# Patient Record
Sex: MALE | Race: ASIAN | URBAN - METROPOLITAN AREA
[De-identification: names, ages, dates, MRNs, and addresses within clinical notes are randomized per-mention and may not be internally consistent; named-entity substitution may affect disease eponyms.]

---

## 2017-03-24 ENCOUNTER — OFFICE VISIT (OUTPATIENT)
Dept: FAMILY MEDICINE | Facility: CLINIC | Age: 47
End: 2017-03-24
Payer: COMMERCIAL

## 2017-03-24 VITALS
HEART RATE: 88 BPM | BODY MASS INDEX: 23.92 KG/M2 | TEMPERATURE: 98.5 F | WEIGHT: 130 LBS | HEIGHT: 62 IN | SYSTOLIC BLOOD PRESSURE: 110 MMHG | DIASTOLIC BLOOD PRESSURE: 76 MMHG

## 2017-03-24 DIAGNOSIS — G47.09 OTHER INSOMNIA: ICD-10-CM

## 2017-03-24 DIAGNOSIS — M54.50 ACUTE BILATERAL LOW BACK PAIN WITHOUT SCIATICA: Primary | ICD-10-CM

## 2017-03-24 DIAGNOSIS — Z72.0 TOBACCO ABUSE: ICD-10-CM

## 2017-03-24 LAB
ALBUMIN SERPL-MCNC: 4.2 G/DL (ref 3.4–5)
ALP SERPL-CCNC: 63 U/L (ref 40–150)
ALT SERPL W P-5'-P-CCNC: 32 U/L (ref 0–70)
ANION GAP SERPL CALCULATED.3IONS-SCNC: 6 MMOL/L (ref 3–14)
AST SERPL W P-5'-P-CCNC: 16 U/L (ref 0–45)
BASOPHILS # BLD AUTO: 0 10E9/L (ref 0–0.2)
BASOPHILS NFR BLD AUTO: 0.2 %
BILIRUB SERPL-MCNC: 0.3 MG/DL (ref 0.2–1.3)
BUN SERPL-MCNC: 14 MG/DL (ref 7–30)
CALCIUM SERPL-MCNC: 9.3 MG/DL (ref 8.5–10.1)
CHLORIDE SERPL-SCNC: 100 MMOL/L (ref 94–109)
CHOLEST SERPL-MCNC: 252 MG/DL
CO2 SERPL-SCNC: 29 MMOL/L (ref 20–32)
CREAT SERPL-MCNC: 0.8 MG/DL (ref 0.66–1.25)
DIFFERENTIAL METHOD BLD: NORMAL
EOSINOPHIL # BLD AUTO: 0.1 10E9/L (ref 0–0.7)
EOSINOPHIL NFR BLD AUTO: 1.9 %
ERYTHROCYTE [DISTWIDTH] IN BLOOD BY AUTOMATED COUNT: 12.2 % (ref 10–15)
GFR SERPL CREATININE-BSD FRML MDRD: NORMAL ML/MIN/1.7M2
GLUCOSE SERPL-MCNC: 97 MG/DL (ref 70–99)
HCT VFR BLD AUTO: 44.8 % (ref 40–53)
HDLC SERPL-MCNC: 51 MG/DL
HGB BLD-MCNC: 15 G/DL (ref 13.3–17.7)
LDLC SERPL CALC-MCNC: ABNORMAL MG/DL
LDLC SERPL DIRECT ASSAY-MCNC: 145 MG/DL
LYMPHOCYTES # BLD AUTO: 2.4 10E9/L (ref 0.8–5.3)
LYMPHOCYTES NFR BLD AUTO: 38.1 %
MCH RBC QN AUTO: 31.6 PG (ref 26.5–33)
MCHC RBC AUTO-ENTMCNC: 33.5 G/DL (ref 31.5–36.5)
MCV RBC AUTO: 95 FL (ref 78–100)
MONOCYTES # BLD AUTO: 0.4 10E9/L (ref 0–1.3)
MONOCYTES NFR BLD AUTO: 6.8 %
NEUTROPHILS # BLD AUTO: 3.3 10E9/L (ref 1.6–8.3)
NEUTROPHILS NFR BLD AUTO: 53 %
NONHDLC SERPL-MCNC: 201 MG/DL
PLATELET # BLD AUTO: 257 10E9/L (ref 150–450)
POTASSIUM SERPL-SCNC: 4.1 MMOL/L (ref 3.4–5.3)
PROT SERPL-MCNC: 7.8 G/DL (ref 6.8–8.8)
RBC # BLD AUTO: 4.74 10E12/L (ref 4.4–5.9)
SODIUM SERPL-SCNC: 135 MMOL/L (ref 133–144)
TRIGL SERPL-MCNC: 458 MG/DL
TSH SERPL DL<=0.005 MIU/L-ACNC: 2.76 MU/L (ref 0.4–4)
WBC # BLD AUTO: 6.2 10E9/L (ref 4–11)

## 2017-03-24 PROCEDURE — 80053 COMPREHEN METABOLIC PANEL: CPT | Performed by: NURSE PRACTITIONER

## 2017-03-24 PROCEDURE — 99204 OFFICE O/P NEW MOD 45 MIN: CPT | Performed by: NURSE PRACTITIONER

## 2017-03-24 PROCEDURE — 84443 ASSAY THYROID STIM HORMONE: CPT | Performed by: NURSE PRACTITIONER

## 2017-03-24 PROCEDURE — 36415 COLL VENOUS BLD VENIPUNCTURE: CPT | Performed by: NURSE PRACTITIONER

## 2017-03-24 PROCEDURE — 80061 LIPID PANEL: CPT | Performed by: NURSE PRACTITIONER

## 2017-03-24 PROCEDURE — 85025 COMPLETE CBC W/AUTO DIFF WBC: CPT | Performed by: NURSE PRACTITIONER

## 2017-03-24 PROCEDURE — 83721 ASSAY OF BLOOD LIPOPROTEIN: CPT | Mod: 59 | Performed by: NURSE PRACTITIONER

## 2017-03-24 RX ORDER — CYCLOBENZAPRINE HCL 10 MG
10 TABLET ORAL EVERY 8 HOURS PRN
Qty: 30 TABLET | Refills: 0 | Status: SHIPPED | OUTPATIENT
Start: 2017-03-24 | End: 2018-04-09

## 2017-03-24 RX ORDER — NAPROXEN 500 MG/1
TABLET ORAL
Qty: 30 TABLET | Refills: 0 | Status: SHIPPED | OUTPATIENT
Start: 2017-03-24 | End: 2018-04-09

## 2017-03-24 ASSESSMENT — ENCOUNTER SYMPTOMS
DIARRHEA: 0
EYE DISCHARGE: 0
RHINORRHEA: 0
HEADACHES: 0
SLEEP DISTURBANCE: 1
FATIGUE: 0
NAUSEA: 0
SHORTNESS OF BREATH: 0
COUGH: 0
SINUS PRESSURE: 0
VOMITING: 0
SORE THROAT: 0
DIAPHORESIS: 0
BACK PAIN: 1
FEVER: 0
WHEEZING: 0

## 2017-03-24 NOTE — PROGRESS NOTES
SUBJECTIVE:                                                    Juan Carlos Delgado is a 46 year old male who presents to clinic today for the following health issues:      Back Pain      Duration: 1 week        Specific cause: none    Description:   Location of pain: low back center  Character of pain: sharp  Pain radiation:none  New numbness or weakness in legs, not attributed to pain:  no     Intensity: At its worst 7-8/10    History:   Pain interferes with job: YES  History of back problems: no prior back problems  Any previous MRI or X-rays: None  Sees a specialist for back pain:  No    Therapies tried without relief: none    Alleviating factors: Improved by: heat, unknown OTC pain relief    Precipitating factors:  Worsened by: Bending    Functional and Psychosocial Screen ([employer] STarT Back):      Not performed today   Accompanying Signs & Symptoms:  Risk of Fracture:  None  Risk of Cauda Equina:  None  Risk of Infection:  None  Risk of Cancer:  None  Risk of Ankylosing Spondylitis:  Onset at age <35, male, AND morning back stiffness. no            Here today with .     Has been having some back pain. Had back pain about 10 years ago as well. Occurred after lifted something heavy. This time bent over and felt a popping in back. Was cleaning car. Pain to lower back. No pain down legs. No loss of control of bowel or bladder. Injury occurred about 1 week ago. Pain has been gradually getting better. Has been using ice and heat at home and taking some medication.     Just moved here about 2 months ago from [place]. Recently had tetanus vaccine prior to coming to [place]. Had it in [place]. 2 years ago had cholesterol checked and it was ok. Is fasting today and would would like to have it checked again.     Has a hard time sleeping at night. Thinks gets about 4 hours of sleep per night. Has a hard time falling asleep but one is asleep does ok. No home meds to help with sleep. Some nights is able to sleep ok.  "Thinks happens about 6 nights a week.     Problem list and histories reviewed & adjusted, as indicated.  Additional history: as documented    Current Outpatient Prescriptions   Medication Sig Dispense Refill     naproxen (NAPROSYN) 500 MG tablet Take twice per day with food for 2 weeks, then as needed 30 tablet 0     cyclobenzaprine (FLEXERIL) 10 MG tablet Take 1 tablet (10 mg) by mouth every 8 hours as needed for muscle spasms 30 tablet 0     No Known Allergies    Reviewed and updated as needed this visit by clinical staff  Tobacco  Allergies  Meds  Med Hx  Surg Hx  Fam Hx  Soc Hx      Reviewed and updated as needed this visit by Provider         ROS:  Review of Systems   Constitutional: Negative for diaphoresis, fatigue and fever.   HENT: Negative for congestion, ear pain, rhinorrhea, sinus pressure and sore throat.    Eyes: Negative for discharge.   Respiratory: Negative for cough, shortness of breath and wheezing.    Cardiovascular: Negative for chest pain.   Gastrointestinal: Negative for diarrhea, nausea and vomiting.   Musculoskeletal: Positive for back pain (lower back).   Neurological: Negative for headaches.   Psychiatric/Behavioral: Positive for sleep disturbance (trouble falling asleep).         OBJECTIVE:                                                    /76 (BP Location: Left arm, Patient Position: Chair, Cuff Size: Adult Regular)  Pulse 88  Temp 98.5  F (36.9  C) (Tympanic)  Ht 5' 2\" (1.575 m)  Wt 130 lb (59 kg)  BMI 23.78 kg/m2  Body mass index is 23.78 kg/(m^2).  Physical Exam   Constitutional: He appears well-developed and well-nourished.   HENT:   Head: Normocephalic and atraumatic.   Right Ear: Tympanic membrane and external ear normal.   Left Ear: Tympanic membrane and external ear normal.   Nose: No mucosal edema or rhinorrhea.   Cardiovascular: Normal rate, regular rhythm and normal heart sounds.    Pulmonary/Chest: Effort normal and breath sounds normal.   Abdominal: Soft. " Bowel sounds are normal.   Musculoskeletal:        Back:         Arms:  Neurological: He is alert.   Skin: Skin is warm and dry.   Psychiatric: He has a normal mood and affect.        ASSESSMENT/PLAN:                                                    1. Acute bilateral low back pain without sciatica  Educated regarding proper lifting technique  May apply ice and heat as needed  Educated on use of meds  - naproxen (NAPROSYN) 500 MG tablet; Take twice per day with food for 2 weeks, then as needed  Dispense: 30 tablet; Refill: 0  - cyclobenzaprine (FLEXERIL) 10 MG tablet; Take 1 tablet (10 mg) by mouth every 8 hours as needed for muscle spasms  Dispense: 30 tablet; Refill: 0    2. Other insomnia  Check basic labs today  Encouraged to start taking over the counter melatonin   Follow up if needed  - CBC with platelets differential  - Comprehensive metabolic panel  - Lipid panel reflex to direct LDL  - TSH with free T4 reflex    3. Tobacco abuse  Encouraged to stop smoking     Plans to bring copies of medical records from [facility] to the clinic so can enter into chart.           GARRETT Thompson [facility]

## 2017-03-24 NOTE — PATIENT INSTRUCTIONS
Try over the counter melatonin to help with sleep.     These are general instructions and may not be specific to you. Please call, email or follow up if you have any questions or concerns.     ?au c?/l?ng [Marleni najera]    ?au c? và ?au l?ng ??u th??ng garcia?t phát t? vi?c b? th??ng ? các c? ho?c dây ch?ng vùng c?t s?ng. ?ôi khi ??a ??m có ch?c n?ng tách các ??t x??ng s?t có th? gây ?âu do t?o l?c ép lên dây th?n kinh g?n nó. ?au c? và ?au l?ng có th? garcia?t hi?n jl m?t l?c v?n mình/co mình ??t ng?t (ch?ng h?n nh? karl m?t rosa isela n?n xe h?i) ho?c ?ôi khi ch? do m?t oumar?n ??ng v?ng v? ??n thu?n. Dù karl tr??ng h?p nào, c? th??ng b? co c?ng và khi?n c?n ?au t?ng thêm.  ?au c? và ?au l?ng c?p tính th??ng ?? jl m?t ??n jerome tu?n. ?au liên estella t?i b?nh ? ??a ??m, viêm kh?p (arthritis) t?i các kh?p c?t s?ng ho?c chít h?p (stenosis) c?t s?ng (h?p c?t s?ng) có th? tr? thành mãn tính và kéo dài hàng tháng, th?m chí hàng n?m.  Ch?m sóc t?i paddy:    ??I V?I ?AU C?: S? d?ng m?t chi?c g?i m?m ?? ?? ??u và gi? c?t s?ng ? v? trí ngh?. V? trí ??u không nên ?? nghiêng v? phía tr??c hay phía jl.    ??I V?I ?AU L?NG: Quý v? có th? c?n ph?i ? trên gi??ng karl vài ngày ??u tiên. Nh?ng hãy b?t ??u ng?i d?y ho?c ?i l?i càng s?m càng t?t ?? tránh các v?n ?? phát sinh do ngh? lâu ? trên d??ng (y?u c?, tình tr?ng c?ng và ?au l?ng t? h?n, hình thành c?c máu ?ông ? chân).    Khi ? trên gi??ng, c? tìm m?t v? trí tho?i mái. S? d?ng n?m c?ng là t?t nh?t. C? g?ng n?m th?ng l?ng và ?? g?i d??i ??u g?i c?a quý v?. Quý v? c?ng có th? th? n?m nghiêng ng??i, ??u g?i co v? phía ng?c và ??t g?i gi?a jerome ??u g?i.    Tránh ng?i lâu karl m?t t? th?. Làm v?y s? t?o áp l?c lên ph?n d??i l?ng vikram?u h?n so v?i khi ??ng ho?c ?i b?.    Karl jerome ngày ??u, ch??m TÚI ?Á vào vùng ?au davin?ng 20 phút jl 2-4 gi?. Làm v?y s? gi?m s?ng và ?au. VIKRAM?T (t?m n??c nóng, ngâm n??c nóng ho?c t?m ch??m vikram?t) có tác d?ng t?t ??i v?i tình tr?ng co c?ng c?. Quý v? có th? b?t ??u v?i  ?á r?i oumar?n sang kristopher?t jl jerome ngày. M?t s? b?nh nhân c?m th?y t?t nh?t khi thay th? gi?a vi?c ?i?u tr? b?ng ?á và kristopher?t. S? d?ng ph??ng pháp nào mà quý v? c?m th?y t?t nh?t.    Quý v? có th? dùng acetaminophen (Tylenol) ho?c ibuprofen (Motrin, Advil) ?? ki?m soát c?n ?au, tr? khi ???c kê ??n m?t lo?i thu?c gi?m ?au khác. [L?U Ý: N?u quý v? m?c b?nh aylin ho?c th?n mãn tính ho?c t?ng b? loét d? dày ho?c ch?y máu karl ivelisse t? (GI), hãy nói oumar?n v?i bác s? c?a quý v? tr??c khi s? d?ng các lo?i thu?c này.]    Chú ý t?i các ph??ng pháp nâng vác an toàn và không ???c bê ?? n?ng quá 15 pound (6,8 kg) t?i khi c?n ?au ?ã h?t.  Ke dõi  cùng bác s? c?a quý v? ho?c c? s? này n?u các tri?u ch?ng c?a quý v? không c?i thi?n jl m?t tu?n. Có th? ph?i c?n t?i v?t lý tr? li?u ho?c ki?m tra b? sung.  [L?U Ý: N?u có ch?p X-vandana, phim s? ???c xem xét b?i m?t bác s? ch?p X-vandana. Quý v? s? ???c thông báo v? m?i phát hi?n m?i mà có th? ?nh h??ng t?i vi?c ch?m sóc quý v?.]  Tìm s? estella tâm y t? ngay l?p t?c  n?u b?t k? ?i?u nào jl ?ây x?y ra:    C?n ?au tr? nên x?u ?i ho?c jose angel ra cánh annie ho?c chân    M?t ho?c c? jerome annie ho?c chân b? y?u t? ho?c ?au    M?t ki?m soát bàng vandana ho?c ru?t    C?m giác tê ? háng    Khó ?i b?    S?t 100,4 F (38 C) ho?c clark h?n ho?c ke ch? d?n c?a nhân viên y t?    3897-8600 [employer]. [address] [place] [address], [place] [address]. All rights reserved. This information is not intended as a substitute for professional medical care. Always follow your healthcare professional's instructions.

## 2017-03-24 NOTE — NURSING NOTE
"Chief Complaint   Patient presents with     Back Pain       Initial /76 (BP Location: Left arm, Patient Position: Chair, Cuff Size: Adult Regular)  Pulse 88  Temp 98.5  F (36.9  C) (Tympanic)  Ht 5' 2\" (1.575 m)  Wt 130 lb (59 kg)  BMI 23.78 kg/m2 Estimated body mass index is 23.78 kg/(m^2) as calculated from the following:    Height as of this encounter: 5' 2\" (1.575 m).    Weight as of this encounter: 130 lb (59 kg).  Medication Reconciliation: complete     April BOY Lorenzana      "

## 2017-03-24 NOTE — LETTER
[address]  [address]  [phone]      March 27, 2017      Juan Carlos Delgado  [address] [place] [id number]              Dear Mr. Delgado,    Your total and bad cholesterol are very elevated. Need to try and have a lower saturated fat diet. Your triglycerides are also very elevated. Need to try and increase your activity and have lower carbohydrate diet. Plan to recheck in 3 months. If there is no improvement may need to start on medication to help decrease your cholesterol.     Your blood counts are all in normal range   Your electrolytes are all in normal range   Your kidney function is normal   Your liver function is normal   Your thyroid is in normal range   Your glucose (blood sugar) is in normal range       Please call or email with any additional questions or concerns         Sincerely,    GARRETT Valencia CNP/EC CMA

## 2017-03-24 NOTE — MR AVS SNAPSHOT
After Visit Summary   3/24/2017    Juan Carlos Delgado    MRN: [id number]           Patient Information     Date Of Birth          1970        Visit Information        Provider Department      3/24/2017 10:30 AM Myrtle Rizzo, GARRETT CNP; [employer] [facility]        Today's Diagnoses     Acute bilateral low back pain without sciatica    -  1    Other insomnia          Care Instructions    Try over the counter melatonin to help with sleep.     These are general instructions and may not be specific to you. Please call, email or follow up if you have any questions or concerns.     ?au c?/l?ng [Nói najera]    ?au c? và ?au l?ng ??u th??ng garcia?t phát t? vi?c b? th??ng ? các c? ho?c dây ch?ng vùng c?t s?ng. ?ôi khi ??a ??m có ch?c n?ng tách các ??t x??ng s?t có th? gây ?âu do t?o l?c ép lên dây th?n kinh g?n nó. ?au c? và ?au l?ng có th? garcia?t hi?n jl m?t l?c v?n mình/co mình ??t ng?t (ch?ng h?n nh? karl m?t rosa isela n?n xe h?i) ho?c ?ôi khi ch? do m?t oumar?n ??ng v?ng v? ??n thu?n. Dù karl tr??ng h?p nào, c? th??ng b? co c?ng và khi?n c?n ?au t?ng thêm.  ?au c? và ?au l?ng c?p tính th??ng ?? jl m?t ??n jerome tu?n. ?au liên estella t?i b?nh ? ??a ??m, viêm kh?p (arthritis) t?i các kh?p c?t s?ng ho?c chít h?p (stenosis) c?t s?ng (h?p c?t s?ng) có th? tr? thành mãn tính jonah manuel, th?m chí hàng n?m.  Ch?m sóc t?i paddy:    ??I V?I ?AU C?: S? d?ng m?t chi?c g?i m?m ?? ?? ??u và gi? c?t s?ng ? v? trí ngh?. V? trí ??u không nên ?? nghiêng v? phía tr??c hay phía jl.    ??I V?I ?AU L?NG: Quý v? có th? c?n ph?i ? trên gi??ng karl vài ngày ??u tiên. Nh?ng hãy b?t ??u ng?i d?y ho?c ?i l?i càng s?m càng t?t ?? tránh các v?n ?? phát sinh do ngh? lâu ? trên d??ng (y?u c?, tình tr?ng c?ng và ?au l?ng t? h?n, hình thành c?c máu ?ông ? chân).    Khi ? trên gi??ng, c? tìm m?t v? trí tho?i mái. S? d?ng n?m c?ng là t?t nh?t. C? g?ng n?m th?ng l?ng và ?? g?i d??i ??u g?i c?a quý v?. Lauren  v? c?ng có th? th? n?m nghiêng ng??i, ??u g?i co v? phía ng?c và ??t g?i gi?a jerome ??u g?i.    Tránh ng?i lâu karl m?t t? th?. Làm v?y s? t?o áp l?c lên ph?n d??i l?ng vikram?u h?n so v?i khi ??ng ho?c ?i b?.    Karl jerome ngày ??u, ch??m TÚI ?Á vào vùng ?au davin?ng 20 phút jl 2-4 gi?. Làm v?y s? gi?m s?ng và ?au. VIKRAM?T (t?m n??c nóng, ngâm n??c nóng ho?c t?m ch??m vikram?t) có tác d?ng t?t ??i v?i tình tr?ng co c?ng c?. Quý v? có th? b?t ??u v?i ?á r?i oumar?n sang vikram?t jl jerome ngày. M?t s? b?nh nhân c?m th?y t?t nh?t khi thay th? gi?a vi?c ?i?u tr? b?ng ?á và vikram?t. S? d?ng ph??ng pháp nào mà quý v? c?m th?y t?t nh?t.    Quý v? có th? dùng acetaminophen (Tylenol) ho?c ibuprofen (Motrin, Advil) ?? ki?m soát c?n ?au, tr? khi ???c kê ??n m?t lo?i thu?c gi?m ?au khác. [L?U Ý: N?u quý v? m?c b?nh aylin ho?c th?n mãn tính ho?c t?ng b? loét d? dày ho?c ch?y máu karl ivelisse t? (GI), hãy nói oumar?n v?i bác s? c?a quý v? tr??c khi s? d?ng các lo?i thu?c này.]    Chú ý t?i các ph??ng pháp nâng vác an toàn và không ???c bê ?? n?ng quá 15 pound (6,8 kg) t?i khi c?n ?au ?ã h?t.  Ke dõi  cùng bác s? c?a quý v? ho?c c? s? này n?u các tri?u ch?ng c?a quý v? không c?i thi?n jl m?t tu?n. Có th? ph?i c?n t?i v?t lý tr? li?u ho?c ki?m tra b? sung.  [L?U Ý: N?u có ch?p X-vandana, phim s? ???c xem xét b?i m?t bác s? ch?p X-vandana. Quý v? s? ???c thông báo v? m?i phát hi?n m?i mà có th? ?nh h??ng t?i vi?c ch?m sóc quý v?.]  Tìm s? estella tâm y t? ngay l?p t?c  n?u b?t k? ?i?u nào jl ?ây x?y ra:    C?n ?au tr? nên x?u ?i ho?c jose angel ra cánh annie ho?c chân    M?t ho?c c? jerome annie ho?c chân b? y?u t? ho?c ?au    M?t ki?m soát bàng vandana ho?c ru?t    C?m giác tê ? háng    Khó ?i b?    S?t 100,4 F (38 C) ho?c clark h?n ho?c ke ch? d?n c?a nhân viên y t?    [id number] [employer]. [address] [place] [address], [place] [address]. All rights reserved. This information is not intended as a substitute for professional medical care. Always follow your  "healthcare professional's instructions.              Follow-ups after your visit        Who to contact     Normal or non-critical lab and imaging results will be communicated to you by [employer], letter or phone within 4 business days after the clinic has received the results. If you do not hear from us within 7 days, please contact the clinic through MyChart or phone. If you have a critical or abnormal lab result, we will notify you by phone as soon as possible.  Submit refill requests through [employer] or call your pharmacy and they will forward the refill request to us. Please allow 3 business days for your refill to be completed.          If you need to speak with a  for additional information , please call: [phone]           Additional Information About Your Visit        [employer] Information     [employer] lets you send messages to your doctor, view your test results, renew your prescriptions, schedule appointments and more. To sign up, go to [email or web address] . Click on \"Log in\" on the left side of the screen, which will take you to the Welcome page. Then click on \"Sign up Now\" on the right side of the page.     You will be asked to enter the access code listed below, as well as some personal information. Please follow the directions to create your username and password.     Your access code is: [email or web address]  Expires: 2017 11:29 AM     Your access code will  in 90 days. If you need help or a new code, please call your [facility] clinic or [phone].        Care EveryWhere ID     This is your Care EveryWhere ID. This could be used by other organizations to access your [facility] medical records  [id number]        Your Vitals Were     Pulse Temperature Height BMI (Body Mass Index)          88 98.5  F (36.9  C) (Tympanic) 5' 2\" (1.575 m) 23.78 kg/m2         Blood Pressure from Last 3 Encounters:   17 110/76    Weight from Last 3 Encounters:   17 130 lb (59 kg)         "      We Performed the Following     CBC with platelets differential     Comprehensive metabolic panel     Lipid panel reflex to direct LDL     TSH with free T4 reflex          Today's Medication Changes          These changes are accurate as of: 3/24/17 11:29 AM.  If you have any questions, ask your nurse or doctor.               Start taking these medicines.        Dose/Directions    cyclobenzaprine 10 MG tablet   Commonly known as:  FLEXERIL   Used for:  Acute bilateral low back pain without sciatica   Started by:  Myrtle Rizzo APRN CNP        Dose:  10 mg   Take 1 tablet (10 mg) by mouth every 8 hours as needed for muscle spasms   Quantity:  30 tablet   Refills:  0       naproxen 500 MG tablet   Commonly known as:  NAPROSYN   Used for:  Acute bilateral low back pain without sciatica   Started by:  Myrtle Rizzo APRN CNP        Take twice per day with food for 2 weeks, then as needed   Quantity:  30 tablet   Refills:  0            Where to get your medicines      These medications were sent to [facility] [address] [facility]  [address] [facility] [address]     Phone:  [phone]     cyclobenzaprine 10 MG tablet    naproxen 500 MG tablet                Primary Care Provider    None Specified       No primary provider on file.        Thank you!     Thank you for choosing [facility]  for your care. Our goal is always to provide you with excellent care. Hearing back from our patients is one way we can continue to improve our services. Please take a few minutes to complete the written survey that you may receive in the mail after your visit with us. Thank you!             Your Updated Medication List - Protect others around you: Learn how to safely use, store and throw away your medicines at www.disposemymeds.org.          This list is accurate as of: 3/24/17 11:29 AM.  Always use your most recent med list.                   Brand Name  Dispense Instructions for use    cyclobenzaprine 10 MG tablet    FLEXERIL    30 tablet    Take 1 tablet (10 mg) by mouth every 8 hours as needed for muscle spasms       naproxen 500 MG tablet    NAPROSYN    30 tablet    Take twice per day with food for 2 weeks, then as needed

## 2018-04-09 ENCOUNTER — OFFICE VISIT (OUTPATIENT)
Dept: FAMILY MEDICINE | Facility: CLINIC | Age: 48
End: 2018-04-09
Payer: COMMERCIAL

## 2018-04-09 VITALS
TEMPERATURE: 98.5 F | DIASTOLIC BLOOD PRESSURE: 78 MMHG | RESPIRATION RATE: 12 BRPM | WEIGHT: 132 LBS | HEIGHT: 62 IN | HEART RATE: 76 BPM | BODY MASS INDEX: 24.29 KG/M2 | SYSTOLIC BLOOD PRESSURE: 122 MMHG

## 2018-04-09 DIAGNOSIS — Z23 NEED FOR VACCINATION: ICD-10-CM

## 2018-04-09 DIAGNOSIS — Z72.0 TOBACCO ABUSE: ICD-10-CM

## 2018-04-09 DIAGNOSIS — R25.1 TREMOR: ICD-10-CM

## 2018-04-09 DIAGNOSIS — G47.9 SLEEP DISTURBANCE: ICD-10-CM

## 2018-04-09 DIAGNOSIS — E78.5 HYPERLIPIDEMIA LDL GOAL <100: ICD-10-CM

## 2018-04-09 DIAGNOSIS — R53.83 FATIGUE, UNSPECIFIED TYPE: ICD-10-CM

## 2018-04-09 DIAGNOSIS — N52.9 ERECTILE DYSFUNCTION, UNSPECIFIED ERECTILE DYSFUNCTION TYPE: ICD-10-CM

## 2018-04-09 DIAGNOSIS — Z00.00 ROUTINE GENERAL MEDICAL EXAMINATION AT A HEALTH CARE FACILITY: Primary | ICD-10-CM

## 2018-04-09 PROCEDURE — 36415 COLL VENOUS BLD VENIPUNCTURE: CPT | Performed by: NURSE PRACTITIONER

## 2018-04-09 PROCEDURE — 80053 COMPREHEN METABOLIC PANEL: CPT | Performed by: NURSE PRACTITIONER

## 2018-04-09 PROCEDURE — 90471 IMMUNIZATION ADMIN: CPT | Performed by: NURSE PRACTITIONER

## 2018-04-09 PROCEDURE — 90732 PPSV23 VACC 2 YRS+ SUBQ/IM: CPT | Performed by: NURSE PRACTITIONER

## 2018-04-09 PROCEDURE — 80061 LIPID PANEL: CPT | Performed by: NURSE PRACTITIONER

## 2018-04-09 PROCEDURE — 99214 OFFICE O/P EST MOD 30 MIN: CPT | Mod: 25 | Performed by: NURSE PRACTITIONER

## 2018-04-09 PROCEDURE — 82306 VITAMIN D 25 HYDROXY: CPT | Performed by: NURSE PRACTITIONER

## 2018-04-09 PROCEDURE — 99396 PREV VISIT EST AGE 40-64: CPT | Mod: 25 | Performed by: NURSE PRACTITIONER

## 2018-04-09 RX ORDER — PROPRANOLOL HYDROCHLORIDE 10 MG/1
10 TABLET ORAL DAILY
Qty: 90 TABLET | Refills: 0 | Status: SHIPPED | OUTPATIENT
Start: 2018-04-09

## 2018-04-09 RX ORDER — TRAZODONE HYDROCHLORIDE 50 MG/1
TABLET, FILM COATED ORAL
Qty: 90 TABLET | Refills: 0 | Status: SHIPPED | OUTPATIENT
Start: 2018-04-09

## 2018-04-09 ASSESSMENT — ENCOUNTER SYMPTOMS
DYSURIA: 0
MYALGIAS: 0
FATIGUE: 1
LIGHT-HEADEDNESS: 0
NAUSEA: 0
NUMBNESS: 0
RHINORRHEA: 0
DIAPHORESIS: 0
EYE DISCHARGE: 0
BRUISES/BLEEDS EASILY: 0
BLOOD IN STOOL: 0
TREMORS: 1
HEADACHES: 0
CONFUSION: 0
COUGH: 0
CHEST TIGHTNESS: 0
VOMITING: 0
SORE THROAT: 0
ARTHRALGIAS: 0
FEVER: 0
WHEEZING: 0
PALPITATIONS: 0
FREQUENCY: 0
SHORTNESS OF BREATH: 0
SINUS PRESSURE: 0
SLEEP DISTURBANCE: 1
DIARRHEA: 0

## 2018-04-09 ASSESSMENT — PAIN SCALES - GENERAL: PAINLEVEL: NO PAIN (0)

## 2018-04-09 NOTE — LETTER
April 11, 2018      Juan Carlos Delgado  [address] [place] [address]  [place] [address]        Dear ,    You have Vitamin D deficiency. You need to start taking Vitamin D3 3000 units daily and plan to recheck your Vitamin D level again in 3 months.  I have entered the order you can call and make a lab only appointment for a date and time that works best for you.     Your blood sugar is just a bit elevated will plan to continue to monitor this in the future.     Your triglycerides are much improved since being checked 1 year ago.  At this time there is no need for medication.  Please try to remain active and have a lower carbohydrate diet.  We will plan to recheck your cholesterol again in 1 year.  Because your cholesterol is improved we may be able to start treatment for your erection issues.  If you would like to do that, please make another office appointment to discuss.       Your electrolytes are all in normal range   Your kidney function is normal   Your liver function is normal     Resulted Orders   Lipid panel reflex to direct LDL Fasting   Result Value Ref Range    Cholesterol 201 (H) <200 mg/dL      Comment:      Desirable:       <200 mg/dl    Triglycerides 141 <150 mg/dL    HDL Cholesterol 51 >39 mg/dL    LDL Cholesterol Calculated 122 (H) <100 mg/dL      Comment:      Above desirable:  100-129 mg/dl  Borderline High:  130-159 mg/dL  High:             160-189 mg/dL  Very high:       >189 mg/dl      Non HDL Cholesterol 150 (H) <130 mg/dL      Comment:      Above Desirable:  130-159 mg/dl  Borderline high:  160-189 mg/dl  High:             190-219 mg/dl  Very high:       >219 mg/dl     Vitamin D Deficiency   Result Value Ref Range    Vitamin D Deficiency screening 16 (L) 20 - 75 ug/L      Comment:      Season, race, dietary intake, and treatment affect the concentration of   25-hydroxy-Vitamin D. Values may decrease during winter months and increase   during summer months. Values 20-29 ug/L may indicate Vitamin D  insufficiency   and values <20 ug/L may indicate Vitamin D deficiency.  Vitamin D determination is routinely performed by an immunoassay specific for   25 hydroxyvitamin D3.  If an individual is on vitamin D2 (ergocalciferol)   supplementation, please specify 25 OH vitamin D2 and D3 level determination by   LCMSMS test [id number].     Comprehensive metabolic panel   Result Value Ref Range    Sodium 137 133 - 144 mmol/L    Potassium 3.7 3.4 - 5.3 mmol/L    Chloride 104 94 - 109 mmol/L    Carbon Dioxide 26 20 - 32 mmol/L    Anion Gap 7 3 - 14 mmol/L    Glucose 100 (H) 70 - 99 mg/dL    Urea Nitrogen 13 7 - 30 mg/dL    Creatinine 0.76 0.66 - 1.25 mg/dL    GFR Estimate >90 >60 mL/min/1.7m2      Comment:      Non  GFR Calc    GFR Estimate If Black >90 >60 mL/min/1.7m2      Comment:       GFR Calc    Calcium 8.7 8.5 - 10.1 mg/dL    Bilirubin Total 0.6 0.2 - 1.3 mg/dL    Albumin 4.3 3.4 - 5.0 g/dL    Protein Total 7.7 6.8 - 8.8 g/dL    Alkaline Phosphatase 54 40 - 150 U/L    ALT 33 0 - 70 U/L    AST 15 0 - 45 U/L       If you have any questions or concerns, please call the clinic at the number listed above.       Sincerely,        GARRETT Thompson CNP / jg

## 2018-04-09 NOTE — NURSING NOTE
"Chief Complaint   Patient presents with     Physical       Initial /78 (BP Location: Left arm, Patient Position: Sitting, Cuff Size: Adult Regular)  Pulse 76  Temp 98.5  F (36.9  C) (Tympanic)  Resp 12  Ht 5' 2\" (1.575 m)  Wt 132 lb (59.9 kg)  BMI 24.14 kg/m2 Estimated body mass index is 24.14 kg/(m^2) as calculated from the following:    Height as of this encounter: 5' 2\" (1.575 m).    Weight as of this encounter: 132 lb (59.9 kg).  Medication Reconciliation: complete     April BOY Lorenzana      "

## 2018-04-09 NOTE — MR AVS SNAPSHOT
After Visit Summary   4/9/2018    Juan Carlos Delgado    MRN: [id number]           Patient Information     Date Of Birth          1970        Visit Information        Provider Department      4/9/2018 12:45 PM Myrtle Rizzo APRN CNP; [employer] [facility]        Today's Diagnoses     Routine general medical examination at a health care facility    -  1    Hyperlipidemia LDL goal <100        Sleep disturbance        Tremor        Fatigue, unspecified type        Tobacco abuse          Care Instructions      Preventive Health Recommendations  Male Ages 40 to 49    Yearly exam:             See your health care provider every year in order to  o   Review health changes.   o   Discuss preventive care.    o   Review your medicines if your doctor has prescribed any.    You should be tested each year for STDs (sexually transmitted diseases) if you re at risk.     Have a cholesterol test every 5 years.     Have a colonoscopy (test for colon cancer) if someone in your family has had colon cancer or polyps before age 50.     After age 45, have a diabetes test (fasting glucose). If you are at risk for diabetes, you should have this test every 3 years.      Talk with your health care provider about whether or not a prostate cancer screening test (PSA) is right for you.    Shots: Get a flu shot each year. Get a tetanus shot every 10 years.     Nutrition:    Eat at least 5 servings of fruits and vegetables daily.     Eat whole-grain bread, whole-wheat pasta and brown rice instead of white grains and rice.     Talk to your provider about Calcium and Vitamin D.     Lifestyle    Exercise for at least 150 minutes a week (30 minutes a day, 5 days a week). This will help you control your weight and prevent disease.     Limit alcohol to one drink per day.     No smoking.     Wear sunscreen to prevent skin cancer.     See your dentist every six months for an exam and cleaning.     "          Follow-ups after your visit        Additional Services     SLEEP EVALUATION & MANAGEMENT REFERRAL - [facility] - [phone]       Please be aware that coverage of these services is subject to the terms and limitations of your health insurance plan.  Call member services at your health plan with any benefit or coverage questions.      Please bring the following to your appointment:    >>   List of current medications   >>   This referral request   >>   Any documents/labs given to you for this referral                      Future tests that were ordered for you today     Open Future Orders        Priority Expected Expires Ordered    SLEEP EVALUATION & MANAGEMENT REFERRAL - [facility] [phone] Routine  4/9/2019 4/9/2018            Who to contact     Normal or non-critical lab and imaging results will be communicated to you by [employer], letter or phone within 4 business days after the clinic has received the results. If you do not hear from us within 7 days, please contact the clinic through [employer] or phone. If you have a critical or abnormal lab result, we will notify you by phone as soon as possible.  Submit refill requests through [employer] or call your pharmacy and they will forward the refill request to us. Please allow 3 business days for your refill to be completed.          If you need to speak with a  for additional information , please call: [phone]           Additional Information About Your Visit        [employer] Information     [employer] lets you send messages to your doctor, view your test results, renew your prescriptions, schedule appointments and more. To sign up, go to [email or web address] . Click on \"Log in\" on the left side of the screen, which will take you to the Welcome page. Then click on \"Sign up Now\" on the right side of the page.     You will be asked to enter the " "access code listed below, as well as some personal information. Please follow the directions to create your username and password.     Your access code is: [email or web address]  Expires: 2018  1:30 PM     Your access code will  in 90 days. If you need help or a new code, please call your [facility] clinic or [phone].        Care EveryWhere ID     This is your Care EveryWhere ID. This could be used by other organizations to access your [facility] medical records  [id number]        Your Vitals Were     Pulse Temperature Respirations Height BMI (Body Mass Index)       76 98.5  F (36.9  C) (Tympanic) 12 5' 2\" (1.575 m) 24.14 kg/m2        Blood Pressure from Last 3 Encounters:   18 122/78   17 110/76    Weight from Last 3 Encounters:   18 132 lb (59.9 kg)   17 130 lb (59 kg)              We Performed the Following     Comprehensive metabolic panel     Lipid panel reflex to direct LDL Fasting     Vitamin D Deficiency          Today's Medication Changes          These changes are accurate as of 18  1:30 PM.  If you have any questions, ask your nurse or doctor.               Start taking these medicines.        Dose/Directions    propranolol 10 MG tablet   Commonly known as:  INDERAL   Used for:  Tremor   Started by:  Myrtle Rizzo APRN CNP        Dose:  10 mg   Take 1 tablet (10 mg) by mouth daily   Quantity:  90 tablet   Refills:  0       traZODone 50 MG tablet   Commonly known as:  DESYREL   Used for:  Sleep disturbance   Started by:  Myrtle Rizzo APRN CNP        Take 1/2-1 tab nightly as needed for sleep   Quantity:  90 tablet   Refills:  0            Where to get your medicines      These medications were [id number] [facility] [address] AT [place] [address] [address] [facility] [address]     Phone:  [phone]     propranolol 10 MG tablet    traZODone 50 MG tablet                Primary Care Provider " Office Phone # Fax #    GARRETT Kohler [phone]       [facility] [address]   [facility] [address]        Equal Access to Services     ZHENG COLEMAN : Hadii aad ku hadmayurio Somaryali, waaxda luqadaha, qaybta kaalmada adeegyada, waxay idiin hayyairn adelenny pippavic larosette acharya. So [facility] [phone].    ATENCIÓN: Si habla español, tiene a hyatt disposición servicios gratuitos de asistencia lingüística. Llame al [phone].    We comply with applicable federal civil rights laws and [place] laws. We do not discriminate on the basis of race, color, national origin, age, disability, sex, sexual orientation, or gender identity.            Thank you!     Thank you for choosing [facility]  for your care. Our goal is always to provide you with excellent care. Hearing back from our patients is one way we can continue to improve our services. Please take a few minutes to complete the written survey that you may receive in the mail after your visit with us. Thank you!             Your Updated Medication List - Protect others around you: Learn how to safely use, store and throw away your medicines at www.disposemymeds.org.          This list is accurate as of 4/9/18  1:30 PM.  Always use your most recent med list.                   Brand Name Dispense Instructions for use Diagnosis    MELATONIN PO           OMEGA 3 PO           propranolol 10 MG tablet    INDERAL    90 tablet    Take 1 tablet (10 mg) by mouth daily    Tremor       traZODone 50 MG tablet    DESYREL    90 tablet    Take 1/2-1 tab nightly as needed for sleep    Sleep disturbance

## 2018-04-10 LAB
ALBUMIN SERPL-MCNC: 4.3 G/DL (ref 3.4–5)
ALP SERPL-CCNC: 54 U/L (ref 40–150)
ALT SERPL W P-5'-P-CCNC: 33 U/L (ref 0–70)
ANION GAP SERPL CALCULATED.3IONS-SCNC: 7 MMOL/L (ref 3–14)
AST SERPL W P-5'-P-CCNC: 15 U/L (ref 0–45)
BILIRUB SERPL-MCNC: 0.6 MG/DL (ref 0.2–1.3)
BUN SERPL-MCNC: 13 MG/DL (ref 7–30)
CALCIUM SERPL-MCNC: 8.7 MG/DL (ref 8.5–10.1)
CHLORIDE SERPL-SCNC: 104 MMOL/L (ref 94–109)
CHOLEST SERPL-MCNC: 201 MG/DL
CO2 SERPL-SCNC: 26 MMOL/L (ref 20–32)
CREAT SERPL-MCNC: 0.76 MG/DL (ref 0.66–1.25)
DEPRECATED CALCIDIOL+CALCIFEROL SERPL-MC: 16 UG/L (ref 20–75)
GFR SERPL CREATININE-BSD FRML MDRD: >90 ML/MIN/1.7M2
GLUCOSE SERPL-MCNC: 100 MG/DL (ref 70–99)
HDLC SERPL-MCNC: 51 MG/DL
LDLC SERPL CALC-MCNC: 122 MG/DL
NONHDLC SERPL-MCNC: 150 MG/DL
POTASSIUM SERPL-SCNC: 3.7 MMOL/L (ref 3.4–5.3)
PROT SERPL-MCNC: 7.7 G/DL (ref 6.8–8.8)
SODIUM SERPL-SCNC: 137 MMOL/L (ref 133–144)
TRIGL SERPL-MCNC: 141 MG/DL

## 2018-04-11 DIAGNOSIS — E78.5 HYPERLIPIDEMIA LDL GOAL <100: ICD-10-CM

## 2018-04-11 DIAGNOSIS — E55.9 VITAMIN D DEFICIENCY: Primary | ICD-10-CM

## 2018-04-11 NOTE — PROGRESS NOTES
Juan Carlos,     You have Vitamin D deficiency. You need to start taking Vitamin D3 3000 units daily and plan to recheck your Vitamin D level again in 3 months.  I have entered the order you can call and make a lab only appointment for a date and time that works best for you.    Your blood sugar is just a bit elevated will plan to continue to monitor this in the future.    Your triglycerides are much improved since being checked 1 year ago.  At this time there is no need for medication.  Please try to remain active and have a lower carbohydrate diet.  We will plan to recheck your cholesterol again in 1 year.  Because your cholesterol is improved we may be able to start treatment for your erection issues.  If you would like to do that, please make another office appointment to discuss.      Your electrolytes are all in normal range  Your kidney function is normal  Your liver function is normal    Please call or email with any additional questions or concerns  GARRETT Valencia CNP

## 2022-07-06 NOTE — PROGRESS NOTES
SUBJECTIVE:   CC: Juan Carlos Delgado is an 47 year old male who presents for preventative health visit.     Healthy Habits:    Do you get at least three servings of calcium containing foods daily (dairy, green leafy vegetables, etc.)? yes    Amount of exercise or daily activities, outside of work: none    Problems taking medications regularly No    Medication side effects: No    Have you had an eye exam in the past two years? yes    Do you see a dentist twice per year? no    Do you have sleep apnea, excessive snoring or daytime drowsiness?yes, wife says he snores every night     Has a hard time sleeping at night. Is taking melatonin without any improvement. Is able to sleep  5-6 hours per night.     Both hands shake for about 1 year.     Hard to achieve erection and will ejaculate prematurely.     Had some benign lumps removed from right thigh around age 30 and again around age 40. Lumps are returning again but are still small.       Today's PHQ-2 Score:   PHQ-2 ( 1999 Pfizer) 4/9/2018 3/24/2017   Q1: Little interest or pleasure in doing things 0 0   Q2: Feeling down, depressed or hopeless 0 0   PHQ-2 Score 0 0       Abuse: Current or Past(Physical, Sexual or Emotional)- No  Do you feel safe in your environment - Yes    Social History   Substance Use Topics     Smoking status: Current Every Day Smoker     Types: Cigarettes     Smokeless tobacco: Never Used     Alcohol use Yes      Comment: occasional      If you drink alcohol do you typically have >3 drinks per day or >7 drinks per week? No                      Last PSA: No results found for: PSA    Reviewed orders with patient. Reviewed health maintenance and updated orders accordingly - Yes  Current Outpatient Prescriptions   Medication Sig Dispense Refill     Omega-3 Fatty Acids (OMEGA 3 PO)        MELATONIN PO        propranolol (INDERAL) 10 MG tablet Take 1 tablet (10 mg) by mouth daily 90 tablet 0     traZODone (DESYREL) 50 MG tablet Take 1/2-1 tab nightly as needed  Addended by: DAR GABRIEL on: 7/6/2022 03:25 PM     Modules accepted: Orders     for sleep 90 tablet 0     No Known Allergies    Reviewed and updated as needed this visit by clinical staff  Tobacco  Allergies  Meds  Problems  Med Hx  Surg Hx  Fam Hx  Soc Hx          Reviewed and updated as needed this visit by Provider  Problems          Here today with wife and  for physical. Is fasting today.     Has been having a hard time sleeping. Is taking melatonin and that does help to fall asleep but still wakes frequently. Does snore. Unsure what wakes him in the middle of the night. Unsure if stops breathing when is sleeping.  When wakes in the AM still feel very tired. Does feel tired but is not able to nap during the day. Does not feel tired when drives.     Has hard time having an erection occurs every time. This started about 5-10 years. If is able to have an erection will ejaculate in less than a minute. No pain in chest. No shortness of breath. Does have some dizziness that will happen if pulls something too heavy.     Hands have been shaking. When has to do something small hands will shake. Hands will shake all the time. No pain in wrists or arms. Has been having this for the last year or so. Maternal grandmother had shaking of hands that started later on in life.     Last PSA: Never, no family history of prostate cancer.   Last Colonoscopy: Never, no family history of colon.  Last eye exam: Within the last 2 years   Last dental exam: Long   Last tetanus vaccine: 10/16  Last influenza vaccine: Fall 2017  Last shingles vaccine: Never   Last pneumonia vaccine: Never     ROS:  Review of Systems   Constitutional: Positive for fatigue. Negative for diaphoresis and fever.   HENT: Negative for congestion, ear pain, postnasal drip, rhinorrhea, sinus pressure and sore throat.    Eyes: Negative for discharge.   Respiratory: Negative for cough, chest tightness, shortness of breath and wheezing.    Cardiovascular: Negative for chest pain and palpitations.   Gastrointestinal: Negative for  "blood in stool, diarrhea, nausea and vomiting.   Genitourinary: Negative for dysuria, frequency and urgency.        Trouble obtaining an erection for 5-10 years  Premature ejaculation    Musculoskeletal: Negative for arthralgias and myalgias.   Skin: Negative for rash.   Neurological: Positive for tremors (bilat, worse when doing small work). Negative for light-headedness, numbness and headaches.   Hematological: Does not bruise/bleed easily.   Psychiatric/Behavioral: Positive for sleep disturbance. Negative for confusion.         OBJECTIVE:   /78 (BP Location: Left arm, Patient Position: Sitting, Cuff Size: Adult Regular)  Pulse 76  Temp 98.5  F (36.9  C) (Tympanic)  Resp 12  Ht 5' 2\" (1.575 m)  Wt 132 lb (59.9 kg)  BMI 24.14 kg/m2  EXAM:  Physical Exam   Constitutional: He appears well-developed and well-nourished.   HENT:   Right Ear: Tympanic membrane and external ear normal.   Left Ear: Tympanic membrane and external ear normal.   Mouth/Throat: Uvula is midline, oropharynx is clear and moist and mucous membranes are normal.   Eyes: Pupils are equal, round, and reactive to light.   Neck: Carotid bruit is not present. No thyromegaly present.   Cardiovascular: Normal rate, regular rhythm and normal heart sounds.    Pulses:       Femoral pulses are 2+ on the right side, and 2+ on the left side.  Pulmonary/Chest: Effort normal and breath sounds normal.   Abdominal: Soft. Bowel sounds are normal. He exhibits no distension. There is no tenderness.   Musculoskeletal: Normal range of motion.   Neurological: He is alert.   Skin: Skin is warm and dry.   Psychiatric: He has a normal mood and affect.       ASSESSMENT/PLAN:   1. Routine general medical examination at a health care facility  Screening guidelines reviewed.   - Comprehensive metabolic panel    2. Hyperlipidemia LDL goal <100  Will recheck fasting lipids today.  Did discuss increasing activity and lower carbohydrate diet.  Did discuss starting statin " medication if needed to help lower cholesterol.   - Lipid panel reflex to direct LDL Fasting  - Comprehensive metabolic panel    3. Sleep disturbance  Informed of my concern for sleep apnea.  Reports does not have time to have sleep study.  Information given and educated on procedure.  We will give prescription for trazodone.  Educated on use.  Highly recommend getting set up for sleep study.  - SLEEP EVALUATION & MANAGEMENT REFERRAL - [facility] [place] - [phone]; Future  - traZODone (DESYREL) 50 MG tablet; Take 1/2-1 tab nightly as needed for sleep  Dispense: 90 tablet; Refill: 0    4. Tremor  Educated on use of medication.  Typically, only notices while was at work.  Will start with daily dosing.  May need to gradually increase dose.  Educated regarding possible side effects.  Offered referral to neurology for further evaluation, declines at this time.  - propranolol (INDERAL) 10 MG tablet; Take 1 tablet (10 mg) by mouth daily  Dispense: 90 tablet; Refill: 0    5. Fatigue, unspecified type  Informed of my concern of sleep apnea.  Had thyroid testing last year which was in normal limits.  We will check vitamin D today  - Vitamin D Deficiency  - SLEEP EVALUATION & MANAGEMENT REFERRAL - [facility] [place] - [phone]; Future    6. Tobacco abuse  Encouraged to stop smoking    7. Need for vaccination  Administer today  - VACCINE ADMINISTRATION, INITIAL  - PNEUMOCOCCAL VACCINE,ADULT,SQ OR IM    8. Erectile dysfunction, unspecified erectile dysfunction type  Would recommend sleep study.  Will plan to recheck lipids today  May need EKG prior to administration of meds  No other concerning cardiac symptoms at this time.  May benefit from Paxil and Viagra in the future.      COUNSELING:  Reviewed preventive health counseling, as reflected in patient instructions       Regular exercise       Immunizations    Vaccinated for: Pneumococcal            "  Colon cancer screening       Prostate cancer screening       Encouraged dental visit        reports that he has been smoking Cigarettes.  He has never used smokeless tobacco.  Tobacco Cessation Action Plan: Information offered: Patient not interested at this time  Estimated body mass index is 24.14 kg/(m^2) as calculated from the following:    Height as of this encounter: 5' 2\" (1.575 m).    Weight as of this encounter: 132 lb (59.9 kg).       Counseling Resources:  ATP IV Guidelines  Pooled Cohorts Equation Calculator  FRAX Risk Assessment  ICSI Preventive Guidelines  Dietary Guidelines for Americans, 2010  USDA's MyPlate  ASA Prophylaxis  Lung CA Screening    GARRETT Thompson [facility]    "